# Patient Record
Sex: FEMALE | Race: WHITE | NOT HISPANIC OR LATINO | Employment: UNEMPLOYED
[De-identification: names, ages, dates, MRNs, and addresses within clinical notes are randomized per-mention and may not be internally consistent; named-entity substitution may affect disease eponyms.]

---

## 2017-07-08 ENCOUNTER — HEALTH MAINTENANCE LETTER (OUTPATIENT)
Age: 47
End: 2017-07-08

## 2018-02-10 ENCOUNTER — OFFICE VISIT (OUTPATIENT)
Dept: URGENT CARE | Facility: URGENT CARE | Age: 48
End: 2018-02-10
Payer: COMMERCIAL

## 2018-02-10 VITALS
TEMPERATURE: 98.3 F | DIASTOLIC BLOOD PRESSURE: 88 MMHG | WEIGHT: 225.2 LBS | SYSTOLIC BLOOD PRESSURE: 136 MMHG | BODY MASS INDEX: 36.35 KG/M2 | OXYGEN SATURATION: 98 % | HEART RATE: 74 BPM | RESPIRATION RATE: 20 BRPM

## 2018-02-10 DIAGNOSIS — J01.90 ACUTE SINUSITIS WITH COEXISTING CONDITION, NEED PROPHYLACTIC TREATMENT: Primary | ICD-10-CM

## 2018-02-10 PROCEDURE — 99213 OFFICE O/P EST LOW 20 MIN: CPT | Performed by: FAMILY MEDICINE

## 2018-02-10 RX ORDER — AZITHROMYCIN 250 MG/1
TABLET, FILM COATED ORAL
Qty: 6 TABLET | Refills: 0 | Status: SHIPPED | OUTPATIENT
Start: 2018-02-10 | End: 2018-02-15

## 2018-02-10 NOTE — PROGRESS NOTES
SUBJECTIVE: Brittany More is a 47 year old female presenting with a chief complaint of nasal congestion and cough .  Onset of symptoms was 1 week(s) ago.  Course of illness is same.    Severity moderate  Current and Associated symptoms: stuffy nose and cough - non-productive  Treatment measures tried include Tylenol/Ibuprofen.  Predisposing factors include None.    Past Medical History:   Diagnosis Date     Essential hypertension, benign      Allergies   Allergen Reactions     No Known Drug Allergies      Social History   Substance Use Topics     Smoking status: Never Smoker     Smokeless tobacco: Never Used     Alcohol use No       ROS:  SKIN: no rash  GI: no vomiting    OBJECTIVE:  /88  Pulse 74  Temp 98.3  F (36.8  C) (Oral)  Resp 20  Wt 225 lb 3.2 oz (102.2 kg)  SpO2 98%  BMI 36.35 kg/p1JIOIGAE APPEARANCE: healthy, alert and no distress  EYES: EOMI,  PERRL, conjunctiva clear  HENT: ear canals and TM's normal.  Nose and mouth without ulcers, erythema or lesions  NECK: supple, nontender, no lymphadenopathy  RESP: lungs clear to auscultation - no rales, rhonchi or wheezes  SKIN: no suspicious lesions or rashes      ICD-10-CM    1. Acute sinusitis with coexisting condition, need prophylactic treatment J01.90 azithromycin (ZITHROMAX) 250 MG tablet     Will fill if symptoms persist  Fluids/Rest, f/u if worse/not any better

## 2018-02-10 NOTE — MR AVS SNAPSHOT
"              After Visit Summary   2/10/2018    Brittany More    MRN: [id number]           Patient Information     Date Of Birth          1970        Visit Information        Provider Department      2/10/2018 3:35 PM Jas Perry,  [facility]        Today's Diagnoses     Acute sinusitis with coexisting condition, need prophylactic treatment    -  1       Follow-ups after your visit        Who to contact     If you have questions or need follow up information about today's clinic visit or your schedule please contact [facility] directly at [phone].  Normal or non-critical lab and imaging results will be communicated to you by [facility], letter or phone within 4 business days after the clinic has received the results. If you do not hear from us within 7 days, please contact the clinic through [place] [facility] or phone. If you have a critical or abnormal lab result, we will notify you by phone as soon as possible.  Submit refill requests through [employer] or call your pharmacy and they will forward the refill request to us. Please allow 3 business days for your refill to be completed.          Additional Information About Your Visit        MyChart Information     [employer] lets you send messages to your doctor, view your test results, renew your prescriptions, schedule appointments and more. To sign up, go to [email or web address] . Click on \"Log in\" on the left side of the screen, which will take you to the Welcome page. Then click on \"Sign up Now\" on the right side of the page.     You will be asked to enter the access code listed below, as well as some personal information. Please follow the directions to create your username and password.     Your access code is: [email or web address]  Expires: 2018  3:51 PM     Your access code will  in 90 days. If you need help or a new code, please call your [place] clinic or [phone].        Care " EveryWhere ID     This is your Care EveryWhere ID. This could be used by other organizations to access your [place] medical records  [id number]        Your Vitals Were     Pulse Temperature Respirations Pulse Oximetry BMI (Body Mass Index)       74 98.3  F (36.8  C) (Oral) 20 98% 36.35 kg/m2        Blood Pressure from Last 3 Encounters:   02/10/18 136/88   03/02/16 140/80   03/15/13 (!) 126/92    Weight from Last 3 Encounters:   02/10/18 225 lb 3.2 oz (102.2 kg)   03/02/16 225 lb (102.1 kg)   03/15/13 219 lb 4.8 oz (99.5 kg)              Today, you had the following     No orders found for display         Today's Medication Changes          These changes are accurate as of 2/10/18  3:51 PM.  If you have any questions, ask your nurse or doctor.               Start taking these medicines.        Dose/Directions    azithromycin 250 MG tablet   Commonly known as:  ZITHROMAX   Used for:  Acute sinusitis with coexisting condition, need prophylactic treatment   Started by:  Jas Perry, DO        Two tablets first day, then one tablet daily for four days.   Quantity:  6 tablet   Refills:  0            Where to get your medicines      Some of these will need a paper prescription and others can be bought over the counter.  Ask your nurse if you have questions.     Bring a paper prescription for each of these medications     azithromycin 250 MG tablet                Primary Care Provider Office Phone # Fax #    Florencio Woody Quan [phone]       [address] [address]        Equal Access to Services     [facility] AH: Hadii yodit aceveso Soestefania, waaxda luqadaha, qaybta kaalmada ciro, rustam blanc. So [facility] [phone].    ATENCIÓN: Si habla español, tiene a wiggins disposición servicios gratuitos de asistencia lingüística. Llame al [phone].    We comply with applicable federal civil rights laws and [place] laws. We do not  discriminate on the basis of race, color, national origin, age, disability, sex, sexual orientation, or gender identity.            Thank you!     Thank you for choosing [facility]  for your care. Our goal is always to provide you with excellent care. Hearing back from our patients is one way we can continue to improve our services. Please take a few minutes to complete the written survey that you may receive in the mail after your visit with us. Thank you!             Your Updated Medication List - Protect others around you: Learn how to safely use, store and throw away your medicines at www.disposemymeds.org.          This list is accurate as of 2/10/18  3:51 PM.  Always use your most recent med list.                   Brand Name Dispense Instructions for use Diagnosis    ADVIL 200 MG capsule   Generic drug:  ibuprofen      Take 200 mg by mouth every 4 hours as needed.        albuterol 108 (90 BASE) MCG/ACT Inhaler    PROAIR HFA    1 Inhaler    Inhale 2 puffs into the lungs 4 times daily as needed for shortness of breath / dyspnea or wheezing    Chest tightness       azithromycin 250 MG tablet    ZITHROMAX    6 tablet    Two tablets first day, then one tablet daily for four days.    Acute sinusitis with coexisting condition, need prophylactic treatment       lisinopril 10 MG tablet    PRINIVIL/ZESTRIL     Take 10 mg by mouth daily.        NO ACTIVE MEDICATIONS      .

## 2018-05-23 ENCOUNTER — OFFICE VISIT (OUTPATIENT)
Dept: URGENT CARE | Facility: URGENT CARE | Age: 48
End: 2018-05-23
Payer: COMMERCIAL

## 2018-05-23 VITALS
WEIGHT: 224.31 LBS | OXYGEN SATURATION: 97 % | DIASTOLIC BLOOD PRESSURE: 92 MMHG | BODY MASS INDEX: 36.2 KG/M2 | RESPIRATION RATE: 16 BRPM | SYSTOLIC BLOOD PRESSURE: 131 MMHG | TEMPERATURE: 98.3 F | HEART RATE: 71 BPM

## 2018-05-23 DIAGNOSIS — J01.00 ACUTE NON-RECURRENT MAXILLARY SINUSITIS: Primary | ICD-10-CM

## 2018-05-23 PROCEDURE — 99213 OFFICE O/P EST LOW 20 MIN: CPT | Performed by: NURSE PRACTITIONER

## 2018-05-23 RX ORDER — FLUTICASONE PROPIONATE 50 MCG
2 SPRAY, SUSPENSION (ML) NASAL DAILY
Qty: 1 BOTTLE | Refills: 0 | Status: SHIPPED | OUTPATIENT
Start: 2018-05-23 | End: 2019-03-11

## 2018-05-23 NOTE — MR AVS SNAPSHOT
After Visit Summary   5/23/2018    Brittany More    MRN: [id number]           Patient Information     Date Of Birth          1970        Visit Information        Provider Department      5/23/2018 4:20 PM Olga Lainez APRN CNP [facility]        Today's Diagnoses     Acute non-recurrent maxillary sinusitis    -  1      Care Instructions      Acute Sinusitis    Acute sinusitis is irritation and swelling of the sinuses. It is usually caused by a viral infection after a common cold. Your doctor can help you find relief.  What is acute sinusitis?  Sinuses are air-filled spaces in the skull behind the face. They are kept moist and clean by a lining of mucosa. Things such as pollen, smoke, and chemical fumes can irritate the mucosa. It can then swell up. As a response to irritation, the mucosa makes more mucus and other fluids. Tiny hairlike cilia cover the mucosa. Cilia help carry mucus toward the opening of the sinus. Too much mucus may cause the cilia to stop working. This blocks the sinus opening. A buildup of fluid in the sinuses then causes pain and pressure. It can also encourage bacteria to grow in the sinuses.  Common symptoms of acute sinusitis  You may have:    Facial soreness pain    Headache    Fever    Fluid draining in the back of the throat (postnasal drip)    Congestion    Drainage that is thick and colored, instead of clear    Cough  Diagnosing acute sinusitis  Your doctor will ask about your symptoms and health history. He or she will look at your ear, nose, and throat. You usually won't need to have X-rays taken.    The doctor may take a sample of mucus to check for bacteria. If you have sinusitis that keeps coming back, you may need imaging tests such as X-rays or CAT scans. This will help your doctor check for a structural problem that may be causing the infection.  Treating acute sinusitis  Treatment is aimed at unblocking the sinus  "opening and helping the cilia work again. You may need to take antihistamine and decongestant medicine. These can reduce inflammation and decrease the amount of fluid your sinuses make. If you have a bacterial infection, you will need to take antibiotic medicine for 10 to 14 days. Take this medicine until it is gone, even if you feel better.  Date Last Reviewed: 10/1/2016    5400-4165 [employer]. [address] [place] [address], [place] [address]. All rights reserved. This information is not intended as a substitute for professional medical care. Always follow your healthcare professional's instructions.                Follow-ups after your visit        Who to contact     If you have questions or need follow up information about today's clinic visit or your schedule please contact [facility] directly at [phone].  Normal or non-critical lab and imaging results will be communicated to you by [employer], letter or phone within 4 business days after the clinic has received the results. If you do not hear from us within 7 days, please contact the clinic through [employer] or phone. If you have a critical or abnormal lab result, we will notify you by phone as soon as possible.  Submit refill requests through [facility] or call your pharmacy and they will forward the refill request to us. Please allow 3 business days for your refill to be completed.          Additional Information About Your Visit        [employer] Information     [facility] lets you send messages to your doctor, view your test results, renew your prescriptions, schedule appointments and more. To sign up, go to [email or web address] . Click on \"Log in\" on the left side of the screen, which will take you to the Welcome page. Then click on \"Sign up Now\" on the right side of the page.     You will be asked to enter the access code listed below, as well as some personal information. Please follow the directions to create your " username and password.     Your access code is: [email or web address]  Expires: 2018  5:13 PM     Your access code will  in 90 days. If you need help or a new code, please call your [place] clinic or [phone].        Care EveryWhere ID     This is your Care EveryWhere ID. This could be used by other organizations to access your [place] medical records  [id number]        Your Vitals Were     Pulse Temperature Respirations Pulse Oximetry BMI (Body Mass Index)       71 98.3  F (36.8  C) (Oral) 16 97% 36.2 kg/m2        Blood Pressure from Last 3 Encounters:   18 (!) 131/92   02/10/18 136/88   16 140/80    Weight from Last 3 Encounters:   18 224 lb 5 oz (101.7 kg)   02/10/18 225 lb 3.2 oz (102.2 kg)   16 225 lb (102.1 kg)              Today, you had the following     No orders found for display         Today's Medication Changes          These changes are accurate as of 18  5:13 PM.  If you have any questions, ask your nurse or doctor.               Start taking these medicines.        Dose/Directions    amoxicillin-clavulanate 875-125 MG per tablet   Commonly known as:  AUGMENTIN   Used for:  Acute non-recurrent maxillary sinusitis   Started by:  Olga Lainez APRN CNP        Dose:  1 tablet   Take 1 tablet by mouth 2 times daily   Quantity:  20 tablet   Refills:  0       fluticasone 50 MCG/ACT spray   Commonly known as:  FLONASE   Used for:  Acute non-recurrent maxillary sinusitis   Started by:  Olga Lainez APRN CNP        Dose:  2 spray   Spray 2 sprays into both nostrils daily   Quantity:  1 Bottle   Refills:  0         Stop taking these medicines if you haven't already. Please contact your care team if you have questions.     albuterol 108 (90 Base) MCG/ACT Inhaler   Commonly known as:  PROAIR HFA   Stopped by:  Olga Lainez APRN CNP                Where to get your medicines      These medications were sent to [facility]  [facility] [address]  [address], [facility] [address]     Phone:  [phone]     amoxicillin-clavulanate 875-125 MG per tablet    fluticasone 50 MCG/ACT spray                Primary Care Provider Office Phone # Fax #    Florencio Woody Quan [phone]       [place] [address] [address] [address]        Equal Access to Services     [facility] AH: Hadii aad ku hadasho Soomaali, waaxda luqadaha, qaybta kaalmada adeegyada, waxay idiin hayaan adeeg kharash la'aan ah. So [facility] [phone].    ATENCIÓN: Si habla español, tiene a wiggins disposición servicios gratuitos de asistencia lingüística. [facility] [phone].    We comply with applicable federal civil rights laws and [place] laws. We do not discriminate on the basis of race, color, national origin, age, disability, sex, sexual orientation, or gender identity.            Thank you!     Thank you for choosing [facility]  for your care. Our goal is always to provide you with excellent care. Hearing back from our patients is one way we can continue to improve our services. Please take a few minutes to complete the written survey that you may receive in the mail after your visit with us. Thank you!             Your Updated Medication List - Protect others around you: Learn how to safely use, store and throw away your medicines at www.disposemymeds.org.          This list is accurate as of 5/23/18  5:13 PM.  Always use your most recent med list.                   Brand Name Dispense Instructions for use Diagnosis    ADVIL 200 MG capsule   Generic drug:  ibuprofen      Take 200 mg by mouth every 4 hours as needed.        amoxicillin-clavulanate 875-125 MG per tablet    AUGMENTIN    20 tablet    Take 1 tablet by mouth 2 times daily    Acute non-recurrent maxillary sinusitis       fluticasone 50 MCG/ACT spray    FLONASE    1 Bottle    Spray 2 sprays into both nostrils daily    Acute  non-recurrent maxillary sinusitis       lisinopril 10 MG tablet    PRINIVIL/ZESTRIL     Take 10 mg by mouth daily.        NO ACTIVE MEDICATIONS      .

## 2018-05-23 NOTE — PATIENT INSTRUCTIONS
Acute Sinusitis    Acute sinusitis is irritation and swelling of the sinuses. It is usually caused by a viral infection after a common cold. Your doctor can help you find relief.  What is acute sinusitis?  Sinuses are air-filled spaces in the skull behind the face. They are kept moist and clean by a lining of mucosa. Things such as pollen, smoke, and chemical fumes can irritate the mucosa. It can then swell up. As a response to irritation, the mucosa makes more mucus and other fluids. Tiny hairlike cilia cover the mucosa. Cilia help carry mucus toward the opening of the sinus. Too much mucus may cause the cilia to stop working. This blocks the sinus opening. A buildup of fluid in the sinuses then causes pain and pressure. It can also encourage bacteria to grow in the sinuses.  Common symptoms of acute sinusitis  You may have:    Facial soreness pain    Headache    Fever    Fluid draining in the back of the throat (postnasal drip)    Congestion    Drainage that is thick and colored, instead of clear    Cough  Diagnosing acute sinusitis  Your doctor will ask about your symptoms and health history. He or she will look at your ear, nose, and throat. You usually won't need to have X-rays taken.    The doctor may take a sample of mucus to check for bacteria. If you have sinusitis that keeps coming back, you may need imaging tests such as X-rays or CAT scans. This will help your doctor check for a structural problem that may be causing the infection.  Treating acute sinusitis  Treatment is aimed at unblocking the sinus opening and helping the cilia work again. You may need to take antihistamine and decongestant medicine. These can reduce inflammation and decrease the amount of fluid your sinuses make. If you have a bacterial infection, you will need to take antibiotic medicine for 10 to 14 days. Take this medicine until it is gone, even if you feel better.  Date Last Reviewed: 10/1/2016    3344-1177 [employer],  [facility]. [address] [place] [address]. All rights reserved. This information is not intended as a substitute for professional medical care. Always follow your healthcare professional's instructions.

## 2018-05-23 NOTE — PROGRESS NOTES
SUBJECTIVE:   Brittany More is a 47 year old female presenting with a chief complaint of   Chief Complaint   Patient presents with     Cough     coigh, chest congestion, wheezing, sob and fatigue for a few days.        She is an established patient of [place].    URI Adult    Onset of symptoms was 7 day(s) ago.  Course of illness is worsening.    Severity moderate  Current and Associated symptoms: stuffy nose, facial pain/pressure and fatigue  Treatment measures tried include Tylenol/Ibuprofen.  Predisposing factors include .      Review of Systems   All other systems reviewed and are negative.      Past Medical History:   Diagnosis Date     Essential hypertension, benign      Family History   Problem Relation Age of Onset     Breast Cancer Mother      dx age 65     DIABETES Father      Thyroid Disease Father      Hypertension Father      Blood Disease Father      clotting     C.A.D. Father      Arthritis Brother      Lipids Brother      Current Outpatient Prescriptions   Medication Sig Dispense Refill     Ibuprofen (ADVIL) 200 MG capsule Take 200 mg by mouth every 4 hours as needed.       lisinopril (PRINIVIL,ZESTRIL) 10 MG tablet Take 10 mg by mouth daily.       NO ACTIVE MEDICATIONS .       Social History   Substance Use Topics     Smoking status: Never Smoker     Smokeless tobacco: Never Used     Alcohol use No       OBJECTIVE  BP (!) 131/92  Pulse 71  Temp 98.3  F (36.8  C) (Oral)  Resp 16  Wt 224 lb 5 oz (101.7 kg)  SpO2 97%  BMI 36.2 kg/m2    Physical Exam   Constitutional: She appears well-developed and well-nourished.   HENT:   Head: Normocephalic and atraumatic.   Right Ear: External ear normal.   Left Ear: External ear normal.   Nose: Mucosal edema and rhinorrhea present. Right sinus exhibits maxillary sinus tenderness and frontal sinus tenderness. Left sinus exhibits maxillary sinus tenderness and frontal sinus tenderness.   Mouth/Throat: Oropharynx is clear and moist.   Eyes:  EOM are normal. Pupils are equal, round, and reactive to light.   Neck: Normal range of motion. Neck supple.   Cardiovascular: Normal rate and regular rhythm.    Pulmonary/Chest: Effort normal and breath sounds normal.   Neurological: She is alert.   Psychiatric: She has a normal mood and affect.       Labs:  No results found for this or any previous visit (from the past 24 hour(s)).        ASSESSMENT:      ICD-10-CM    1. Acute non-recurrent maxillary sinusitis J01.00 amoxicillin-clavulanate (AUGMENTIN) 875-125 MG per tablet     fluticasone (FLONASE) 50 MCG/ACT spray        PLAN:    URI Adult:  Tylenol, Ibuprofen, Fluids, Rest, OTC decongestant/antihistamine and Saline nasal spray  augmentin if greater than 10 days or worsening     Followup:    If not improving or if condition worsens, follow up with your Primary Care Provider    Patient Instructions     Acute Sinusitis    Acute sinusitis is irritation and swelling of the sinuses. It is usually caused by a viral infection after a common cold. Your doctor can help you find relief.  What is acute sinusitis?  Sinuses are air-filled spaces in the skull behind the face. They are kept moist and clean by a lining of mucosa. Things such as pollen, smoke, and chemical fumes can irritate the mucosa. It can then swell up. As a response to irritation, the mucosa makes more mucus and other fluids. Tiny hairlike cilia cover the mucosa. Cilia help carry mucus toward the opening of the sinus. Too much mucus may cause the cilia to stop working. This blocks the sinus opening. A buildup of fluid in the sinuses then causes pain and pressure. It can also encourage bacteria to grow in the sinuses.  Common symptoms of acute sinusitis  You may have:    Facial soreness pain    Headache    Fever    Fluid draining in the back of the throat (postnasal drip)    Congestion    Drainage that is thick and colored, instead of clear    Cough  Diagnosing acute sinusitis  Your doctor will ask about  your symptoms and health history. He or she will look at your ear, nose, and throat. You usually won't need to have X-rays taken.    The doctor may take a sample of mucus to check for bacteria. If you have sinusitis that keeps coming back, you may need imaging tests such as X-rays or CAT scans. This will help your doctor check for a structural problem that may be causing the infection.  Treating acute sinusitis  Treatment is aimed at unblocking the sinus opening and helping the cilia work again. You may need to take antihistamine and decongestant medicine. These can reduce inflammation and decrease the amount of fluid your sinuses make. If you have a bacterial infection, you will need to take antibiotic medicine for 10 to 14 days. Take this medicine until it is gone, even if you feel better.  Date Last Reviewed: 10/1/2016    4579-0631 [employer]. [address] [place] [address], [place] [address]. All rights reserved. This information is not intended as a substitute for professional medical care. Always follow your healthcare professional's instructions.

## 2019-03-11 ENCOUNTER — OFFICE VISIT (OUTPATIENT)
Dept: URGENT CARE | Facility: URGENT CARE | Age: 49
End: 2019-03-11
Payer: COMMERCIAL

## 2019-03-11 VITALS
WEIGHT: 223 LBS | DIASTOLIC BLOOD PRESSURE: 87 MMHG | HEART RATE: 89 BPM | OXYGEN SATURATION: 98 % | TEMPERATURE: 98.4 F | SYSTOLIC BLOOD PRESSURE: 130 MMHG | RESPIRATION RATE: 17 BRPM

## 2019-03-11 DIAGNOSIS — R05.9 COUGH: ICD-10-CM

## 2019-03-11 DIAGNOSIS — R09.89 CHEST CONGESTION: Primary | ICD-10-CM

## 2019-03-11 DIAGNOSIS — R05.8 PRODUCTIVE COUGH: ICD-10-CM

## 2019-03-11 PROCEDURE — 99214 OFFICE O/P EST MOD 30 MIN: CPT | Performed by: PHYSICIAN ASSISTANT

## 2019-03-11 RX ORDER — AZITHROMYCIN 250 MG/1
TABLET, FILM COATED ORAL
Qty: 6 TABLET | Refills: 0 | Status: SHIPPED | OUTPATIENT
Start: 2019-03-11 | End: 2019-03-29

## 2019-03-11 RX ORDER — BENZONATATE 200 MG/1
200 CAPSULE ORAL 3 TIMES DAILY PRN
Qty: 21 CAPSULE | Refills: 0 | Status: SHIPPED | OUTPATIENT
Start: 2019-03-11 | End: 2019-03-29

## 2019-03-11 NOTE — PROGRESS NOTES
SUBJECTIVE:   Brittany More is a 48 year old female presenting with a chief complaint of stuffy nose, cough - productive and facial pain/pressure.  Onset of symptoms was 4 day(s) ago.  Course of illness is worsening.    Severity moderate  Current and Associated symptoms: chest congestion, productive cough  Treatment measures tried include OTC medicaitons.  Predisposing factors include recent illness.    Past Medical History:   Diagnosis Date     Essential hypertension, benign         Allergies   Allergen Reactions     No Known Drug Allergies      Family History   Problem Relation Age of Onset     Breast Cancer Mother         dx age 65     Diabetes Father      Thyroid Disease Father      Hypertension Father      Blood Disease Father         clotting     C.A.D. Father      Arthritis Brother      Lipids Brother          Social History     Tobacco Use     Smoking status: Never Smoker     Smokeless tobacco: Never Used   Substance Use Topics     Alcohol use: No       ROS:  CONSTITUTIONAL:NEGATIVE for fever, chills, change in weight  INTEGUMENTARY/SKIN: NEGATIVE for worrisome rashes, moles or lesions  EYES: NEGATIVE for vision changes or irritation  ENT/MOUTH: POSITIVE for nasal congestion  RESP:POSITIVE for cough-productive  CV: NEGATIVE for chest pain, palpitations or peripheral edema  GI: NEGATIVE for nausea, abdominal pain, heartburn, or change in bowel habits  MUSCULOSKELETAL: NEGATIVE for significant arthralgias or myalgia  NEURO: NEGATIVE for weakness, dizziness or paresthesias    OBJECTIVE  :/87   Pulse 89   Temp 98.4  F (36.9  C) (Oral)   Resp 17   Wt 101.2 kg (223 lb)   SpO2 98%   GENERAL APPEARANCE: healthy, alert and no distress  EYES: EOMI,  PERRL, conjunctiva clear  HENT: TM's normal bilaterally and nasal turbinates erythematous, swollen  NECK: supple, nontender, no lymphadenopathy  RESP: lungs clear to auscultation - no rales, rhonchi or wheezes  CV: regular rates and rhythm, normal S1 S2, no  murmur noted  ABDOMEN:  soft, nontender, no HSM or masses and bowel sounds normal  NEURO: Normal strength and tone, sensory exam grossly normal,  normal speech and mentation  SKIN: no suspicious lesions or rashes    ASSESSMENT/PLAN    ICD-10-CM    1. Chest congestion R09.89 azithromycin (ZITHROMAX) 250 MG tablet   2. Productive cough R05 azithromycin (ZITHROMAX) 250 MG tablet   3. Cough R05 benzonatate (TESSALON) 200 MG capsule       Orders Placed This Encounter     azithromycin (ZITHROMAX) 250 MG tablet     benzonatate (TESSALON) 200 MG capsule       Fluids, rest  Follow up with PCP as needed  See orders in Epic

## 2019-03-11 NOTE — LETTER
[facility] [place] [facility]  [address] [address]  [phone]      March 11, 2019    RE:  Brittany More                                                                                                                                                       [facility] [address]            To whom it may concern:    Brittany More was seen in the urgent care today for bronchitis.  She was treated with antibiotics and cough medicine.        Sincerely,        [facility]

## 2019-03-29 ENCOUNTER — OFFICE VISIT (OUTPATIENT)
Dept: URGENT CARE | Facility: URGENT CARE | Age: 49
End: 2019-03-29
Payer: COMMERCIAL

## 2019-03-29 VITALS
WEIGHT: 228.38 LBS | HEART RATE: 84 BPM | DIASTOLIC BLOOD PRESSURE: 82 MMHG | TEMPERATURE: 98.2 F | SYSTOLIC BLOOD PRESSURE: 124 MMHG

## 2019-03-29 DIAGNOSIS — H01.003 BLEPHARITIS OF BOTH EYES, UNSPECIFIED EYELID, UNSPECIFIED TYPE: Primary | ICD-10-CM

## 2019-03-29 DIAGNOSIS — H01.006 BLEPHARITIS OF BOTH EYES, UNSPECIFIED EYELID, UNSPECIFIED TYPE: Primary | ICD-10-CM

## 2019-03-29 PROCEDURE — 99213 OFFICE O/P EST LOW 20 MIN: CPT | Performed by: FAMILY MEDICINE

## 2019-03-29 RX ORDER — CIPROFLOXACIN HYDROCHLORIDE 3.5 MG/ML
SOLUTION/ DROPS TOPICAL
Qty: 1 BOTTLE | Refills: 0 | Status: SHIPPED | OUTPATIENT
Start: 2019-03-29

## 2019-03-29 RX ORDER — AZITHROMYCIN 250 MG/1
TABLET, FILM COATED ORAL
Qty: 6 TABLET | Refills: 0 | Status: SHIPPED | OUTPATIENT
Start: 2019-03-29 | End: 2019-04-03

## 2019-03-29 NOTE — PROGRESS NOTES
SUBJECTIVE:  Brittany More presents  for evaluation of infection in both eye. The eye has been itchy with drainage.  Brittany woke this AM with the eye glued shut with matter.  There has been no foreign body in eye, contact lens, upper respiratory infection or allergies.  No one at home has had infection.    OBJECTIVE:  /82   Pulse 84   Temp 98.2  F (36.8  C) (Oral)   Wt 103.6 kg (228 lb 6 oz)   EYES: left eye blepharal and scleral injection - no crusting  Positive discharge. discharge.  No periorbital edema   Lids red , most on rt    ASSESSMENT       CONJUNCTIVITIS, ACUTE  bleharitis    PLAN:        ciloxan  2 zithro    Patient education re: conjunctivitis, viral vs bacterial, treatment, precautions and prevention of spread to others.  return to clinic within week if no improvement of worsening

## 2019-07-08 ENCOUNTER — OFFICE VISIT (OUTPATIENT)
Dept: URGENT CARE | Facility: URGENT CARE | Age: 49
End: 2019-07-08
Payer: COMMERCIAL

## 2019-07-08 VITALS
RESPIRATION RATE: 16 BRPM | OXYGEN SATURATION: 98 % | HEART RATE: 80 BPM | HEIGHT: 65 IN | SYSTOLIC BLOOD PRESSURE: 129 MMHG | BODY MASS INDEX: 37.99 KG/M2 | WEIGHT: 228 LBS | TEMPERATURE: 98.8 F | DIASTOLIC BLOOD PRESSURE: 80 MMHG

## 2019-07-08 DIAGNOSIS — H10.023 PINK EYE DISEASE OF BOTH EYES: Primary | ICD-10-CM

## 2019-07-08 PROCEDURE — 99213 OFFICE O/P EST LOW 20 MIN: CPT | Performed by: FAMILY MEDICINE

## 2019-07-08 RX ORDER — TOBRAMYCIN 3 MG/ML
2 SOLUTION/ DROPS OPHTHALMIC 4 TIMES DAILY
Qty: 3 ML | Refills: 0 | Status: SHIPPED | OUTPATIENT
Start: 2019-07-08 | End: 2019-07-15

## 2019-07-08 ASSESSMENT — MIFFLIN-ST. JEOR: SCORE: 1660.08

## 2019-07-08 NOTE — PROGRESS NOTES
"SUBJECTIVE:Chief Complaint:   Chief Complaint   Patient presents with     Eye Problem     Pt woke up with both eyes crusted over and itchy       History of Present Illness: Brittany More is a 48 year old female who presents complaining of both eyes mattering and redness for 1 days.  Onset/timing: gradual.   Associated Signs and Symptoms: none   Treatment measures tried include: none   Contact wearer : No    Past Medical History:   Diagnosis Date     Essential hypertension, benign      Allergies   Allergen Reactions     No Known Drug Allergies      Social History     Tobacco Use     Smoking status: Never Smoker     Smokeless tobacco: Never Used   Substance Use Topics     Alcohol use: No     ROS:  negative for photophobia, pain, vision change    OBJECTIVE:  /80 (BP Location: Left arm, Patient Position: Sitting, Cuff Size: Adult Regular)   Pulse 80   Temp 98.8  F (37.1  C) (Oral)   Resp 16   Ht 1.651 m (5' 5\")   Wt 103.4 kg (228 lb)   SpO2 98%   BMI 37.94 kg/m     General: no acute distress  Eye exam: right eye abnormal findings: conjunctivitis with erythema, discharge and matting noted, left eye abnormal findings: conjunctivitis with erythema, discharge and matting noted.  CAMILLE, EOMI, fundi normal, corneas normal, no foreign bodies, visual acuity normal both eyes, no periorbital cellulitis      ICD-10-CM    1. Pink eye disease of both eyes H10.023 tobramycin (TOBREX) 0.3 % ophthalmic solution         "

## 2020-05-23 ENCOUNTER — OFFICE VISIT (OUTPATIENT)
Dept: URGENT CARE | Facility: URGENT CARE | Age: 50
End: 2020-05-23
Payer: COMMERCIAL

## 2020-05-23 VITALS
TEMPERATURE: 99.2 F | DIASTOLIC BLOOD PRESSURE: 89 MMHG | SYSTOLIC BLOOD PRESSURE: 134 MMHG | HEART RATE: 96 BPM | OXYGEN SATURATION: 97 %

## 2020-05-23 DIAGNOSIS — R59.9 ENLARGED LYMPH NODES: Primary | ICD-10-CM

## 2020-05-23 LAB
BASOPHILS # BLD AUTO: 0.1 10E9/L (ref 0–0.2)
BASOPHILS NFR BLD AUTO: 0.5 %
DIFFERENTIAL METHOD BLD: NORMAL
EOSINOPHIL # BLD AUTO: 0.2 10E9/L (ref 0–0.7)
EOSINOPHIL NFR BLD AUTO: 2.4 %
ERYTHROCYTE [DISTWIDTH] IN BLOOD BY AUTOMATED COUNT: 14.4 % (ref 10–15)
HCT VFR BLD AUTO: 39.8 % (ref 35–47)
HGB BLD-MCNC: 13 G/DL (ref 11.7–15.7)
LYMPHOCYTES # BLD AUTO: 2 10E9/L (ref 0.8–5.3)
LYMPHOCYTES NFR BLD AUTO: 20.1 %
MCH RBC QN AUTO: 29.4 PG (ref 26.5–33)
MCHC RBC AUTO-ENTMCNC: 32.7 G/DL (ref 31.5–36.5)
MCV RBC AUTO: 90 FL (ref 78–100)
MONOCYTES # BLD AUTO: 0.8 10E9/L (ref 0–1.3)
MONOCYTES NFR BLD AUTO: 7.6 %
NEUTROPHILS # BLD AUTO: 7 10E9/L (ref 1.6–8.3)
NEUTROPHILS NFR BLD AUTO: 69.4 %
PLATELET # BLD AUTO: 358 10E9/L (ref 150–450)
RBC # BLD AUTO: 4.42 10E12/L (ref 3.8–5.2)
WBC # BLD AUTO: 10 10E9/L (ref 4–11)

## 2020-05-23 PROCEDURE — 99214 OFFICE O/P EST MOD 30 MIN: CPT | Performed by: NURSE PRACTITIONER

## 2020-05-23 PROCEDURE — 36415 COLL VENOUS BLD VENIPUNCTURE: CPT | Performed by: NURSE PRACTITIONER

## 2020-05-23 PROCEDURE — 85025 COMPLETE CBC W/AUTO DIFF WBC: CPT | Performed by: NURSE PRACTITIONER

## 2020-05-23 NOTE — PROGRESS NOTES
SUBJECTIVE:   Brittany More is a 49 year old female presenting with a chief complaint of tenderness behind the left ear for several days. She did see the dentist on Tuesday for a cleaning. She does have a history of TMJ disorder, but this feels very different. The are is tender and swollen.    Onset of symptoms was 3 day(s) ago.  Course of illness is same.    Severity moderate  Previous Treatment none      Past Medical History:   Diagnosis Date     Essential hypertension, benign      Current Outpatient Medications   Medication Sig Dispense Refill     lisinopril (PRINIVIL,ZESTRIL) 10 MG tablet Take 40 mg by mouth daily        ciprofloxacin (CILOXAN) 0.3 % ophthalmic solution 1 gtt each eye tid for 7 days (Patient not taking: Reported on 7/8/2019) 1 Bottle 0     Ibuprofen (ADVIL) 200 MG capsule Take 200 mg by mouth every 4 hours as needed.       Social History     Tobacco Use     Smoking status: Never Smoker     Smokeless tobacco: Never Used   Substance Use Topics     Alcohol use: No     Family History   Problem Relation Age of Onset     Breast Cancer Mother         dx age 65     Diabetes Father      Thyroid Disease Father      Hypertension Father      Blood Disease Father         clotting     C.A.D. Father      Arthritis Brother      Lipids Brother         ROS: 10 point ROS neg other than the symptoms noted above in the HPI.     OBJECTIVE  /89   Pulse 96   Temp 99.2  F (37.3  C) (Oral)   SpO2 97%       GENERAL APPEARANCE: healthy appearing, alert     EYES: PERRL, EOMI, sclera non-icteric     HENT: oral exam benign, mucus membranes intact, without ulcers or lesions     NECK: postauricular adenopathy on the left     RESP: lungs clear to auscultation - no rales, rhonchi or wheezes     CV: regular rates and rhythm, no murmurs, rubs, or gallop     MS: extremities normal- no gross deformities noted; normal muscle tone.     SKIN: no suspicious lesions or rashes     NEURO: Normal strength and tone, mentation  intact and speech normal     PSYCH: normal thought process; no significant mood disturbance      Labs:  Results for orders placed or performed in visit on 05/23/20 (from the past 24 hour(s))   CBC with platelets and differential   Result Value Ref Range    WBC 10.0 4.0 - 11.0 10e9/L    RBC Count 4.42 3.8 - 5.2 10e12/L    Hemoglobin 13.0 11.7 - 15.7 g/dL    Hematocrit 39.8 35.0 - 47.0 %    MCV 90 78 - 100 fl    MCH 29.4 26.5 - 33.0 pg    MCHC 32.7 31.5 - 36.5 g/dL    RDW 14.4 10.0 - 15.0 %    Platelet Count 358 150 - 450 10e9/L    % Neutrophils 69.4 %    % Lymphocytes 20.1 %    % Monocytes 7.6 %    % Eosinophils 2.4 %    % Basophils 0.5 %    Absolute Neutrophil 7.0 1.6 - 8.3 10e9/L    Absolute Lymphocytes 2.0 0.8 - 5.3 10e9/L    Absolute Monocytes 0.8 0.0 - 1.3 10e9/L    Absolute Eosinophils 0.2 0.0 - 0.7 10e9/L    Absolute Basophils 0.1 0.0 - 0.2 10e9/L    Diff Method Automated Method          ASSESSMENT/PLAN:      ICD-10-CM    1. Enlarged lymph nodes  R59.9 CBC with platelets and differential      Will need further evaluation if enlargement persist to rule out other causes, such as lymphoma.  Follow Up:      Patient Instructions   Acetaminophen (Tylenol) may be taken up to 4000mg daily (3000mg if over 65) which would be 2 regular strength tablets (325mg) or two extra strength tablets(500mg) up to 4 times a day (3 times a day if over 65).   Check for acetaminophen in other OTC or prescription medications and be sure you add this in the maximum amount you take every day.    Too much acetaminophen can lead to serious liver damage. DO NOT TAKE if you have a history of liver disease.    Ibuprofen 200mg tablets may be taken up to 4 pills 4 times a day(three times a day if over 65)  to the maximum of 3200mg,  (2400mg if >65)  daily as needed for pain.   Take with food.  Don't take with aspirin, Aleve or other antiinflammatory medication or with warfarin. DO NOT TAKE if you have a history of kidney disease.      Warm  packs.    If worsens come in for a recheck, if not gone in a month see your primary care provider.      CHERIE Villarreal, CNP  [facility] Provider

## 2022-05-09 ENCOUNTER — HOSPITAL ENCOUNTER (EMERGENCY)
Facility: CLINIC | Age: 52
Discharge: HOME | End: 2022-05-09
Attending: EMERGENCY MEDICINE | Admitting: EMERGENCY MEDICINE
Payer: COMMERCIAL

## 2022-05-09 VITALS
OXYGEN SATURATION: 97 % | BODY MASS INDEX: 33.75 KG/M2 | SYSTOLIC BLOOD PRESSURE: 119 MMHG | RESPIRATION RATE: 16 BRPM | DIASTOLIC BLOOD PRESSURE: 78 MMHG | WEIGHT: 210 LBS | HEIGHT: 66 IN | HEART RATE: 79 BPM | TEMPERATURE: 98.1 F

## 2022-05-09 DIAGNOSIS — U07.1 INFECTION DUE TO 2019 NOVEL CORONAVIRUS: ICD-10-CM

## 2022-05-09 LAB
ALBUMIN SERPL-MCNC: 4 G/DL (ref 3.4–5)
ALP SERPL-CCNC: 55 U/L (ref 40–150)
ALT SERPL W P-5'-P-CCNC: 37 U/L (ref 0–50)
ANION GAP SERPL CALCULATED.3IONS-SCNC: 5 MMOL/L (ref 3–14)
AST SERPL W P-5'-P-CCNC: 19 U/L (ref 0–45)
BASOPHILS # BLD AUTO: 0.1 10E3/UL (ref 0–0.2)
BASOPHILS NFR BLD AUTO: 1 %
BILIRUB SERPL-MCNC: 0.2 MG/DL (ref 0.2–1.3)
BUN SERPL-MCNC: 12 MG/DL (ref 7–30)
CALCIUM SERPL-MCNC: 9.2 MG/DL (ref 8.5–10.1)
CHLORIDE BLD-SCNC: 108 MMOL/L (ref 94–109)
CO2 SERPL-SCNC: 25 MMOL/L (ref 20–32)
CREAT SERPL-MCNC: 0.56 MG/DL (ref 0.52–1.04)
EOSINOPHIL # BLD AUTO: 0 10E3/UL (ref 0–0.7)
EOSINOPHIL NFR BLD AUTO: 1 %
ERYTHROCYTE [DISTWIDTH] IN BLOOD BY AUTOMATED COUNT: 13.2 % (ref 10–15)
ERYTHROCYTE [SEDIMENTATION RATE] IN BLOOD BY WESTERGREN METHOD: 10 MM/HR (ref 0–30)
GFR SERPL CREATININE-BSD FRML MDRD: >90 ML/MIN/1.73M2
GLUCOSE BLD-MCNC: 87 MG/DL (ref 70–99)
HCT VFR BLD AUTO: 43.8 % (ref 35–47)
HGB BLD-MCNC: 14.2 G/DL (ref 11.7–15.7)
IMM GRANULOCYTES # BLD: 0 10E3/UL
IMM GRANULOCYTES NFR BLD: 0 %
LYMPHOCYTES # BLD AUTO: 0.4 10E3/UL (ref 0.8–5.3)
LYMPHOCYTES NFR BLD AUTO: 8 %
MCH RBC QN AUTO: 30.5 PG (ref 26.5–33)
MCHC RBC AUTO-ENTMCNC: 32.4 G/DL (ref 31.5–36.5)
MCV RBC AUTO: 94 FL (ref 78–100)
MONOCYTES # BLD AUTO: 0.8 10E3/UL (ref 0–1.3)
MONOCYTES NFR BLD AUTO: 16 %
NEUTROPHILS # BLD AUTO: 3.7 10E3/UL (ref 1.6–8.3)
NEUTROPHILS NFR BLD AUTO: 74 %
NRBC # BLD AUTO: 0 10E3/UL
NRBC BLD AUTO-RTO: 0 /100
PLATELET # BLD AUTO: 261 10E3/UL (ref 150–450)
POTASSIUM BLD-SCNC: 3.8 MMOL/L (ref 3.4–5.3)
PROT SERPL-MCNC: 7.8 G/DL (ref 6.8–8.8)
RBC # BLD AUTO: 4.66 10E6/UL (ref 3.8–5.2)
SARS-COV-2 RNA RESP QL NAA+PROBE: POSITIVE
SODIUM SERPL-SCNC: 138 MMOL/L (ref 133–144)
WBC # BLD AUTO: 5.1 10E3/UL (ref 4–11)

## 2022-05-09 PROCEDURE — U0003 INFECTIOUS AGENT DETECTION BY NUCLEIC ACID (DNA OR RNA); SEVERE ACUTE RESPIRATORY SYNDROME CORONAVIRUS 2 (SARS-COV-2) (CORONAVIRUS DISEASE [COVID-19]), AMPLIFIED PROBE TECHNIQUE, MAKING USE OF HIGH THROUGHPUT TECHNOLOGIES AS DESCRIBED BY CMS-2020-01-R: HCPCS | Performed by: EMERGENCY MEDICINE

## 2022-05-09 PROCEDURE — 250N000011 HC RX IP 250 OP 636: Performed by: EMERGENCY MEDICINE

## 2022-05-09 PROCEDURE — 80053 COMPREHEN METABOLIC PANEL: CPT | Performed by: EMERGENCY MEDICINE

## 2022-05-09 PROCEDURE — 99284 EMERGENCY DEPT VISIT MOD MDM: CPT | Mod: 25

## 2022-05-09 PROCEDURE — 36415 COLL VENOUS BLD VENIPUNCTURE: CPT | Performed by: EMERGENCY MEDICINE

## 2022-05-09 PROCEDURE — 96375 TX/PRO/DX INJ NEW DRUG ADDON: CPT

## 2022-05-09 PROCEDURE — 85652 RBC SED RATE AUTOMATED: CPT | Performed by: EMERGENCY MEDICINE

## 2022-05-09 PROCEDURE — C9803 HOPD COVID-19 SPEC COLLECT: HCPCS

## 2022-05-09 PROCEDURE — 96374 THER/PROPH/DIAG INJ IV PUSH: CPT

## 2022-05-09 PROCEDURE — 85004 AUTOMATED DIFF WBC COUNT: CPT | Performed by: EMERGENCY MEDICINE

## 2022-05-09 RX ORDER — DEXAMETHASONE SODIUM PHOSPHATE 10 MG/ML
10 INJECTION, SOLUTION INTRAMUSCULAR; INTRAVENOUS ONCE
Status: COMPLETED | OUTPATIENT
Start: 2022-05-09 | End: 2022-05-09

## 2022-05-09 RX ORDER — DIPHENHYDRAMINE HYDROCHLORIDE 50 MG/ML
50 INJECTION INTRAMUSCULAR; INTRAVENOUS ONCE
Status: COMPLETED | OUTPATIENT
Start: 2022-05-09 | End: 2022-05-09

## 2022-05-09 RX ADMIN — DIPHENHYDRAMINE HYDROCHLORIDE 50 MG: 50 INJECTION, SOLUTION INTRAMUSCULAR; INTRAVENOUS at 15:28

## 2022-05-09 RX ADMIN — DEXAMETHASONE SODIUM PHOSPHATE 10 MG: 10 INJECTION, SOLUTION INTRAMUSCULAR; INTRAVENOUS at 15:28

## 2022-05-09 RX ADMIN — PROCHLORPERAZINE EDISYLATE 10 MG: 5 INJECTION INTRAMUSCULAR; INTRAVENOUS at 15:30

## 2022-05-09 ASSESSMENT — ENCOUNTER SYMPTOMS
WEAKNESS: 0
NECK PAIN: 1
SORE THROAT: 0
BACK PAIN: 1
FEVER: 0
NUMBNESS: 0
HEADACHES: 1
COUGH: 0

## 2022-05-09 NOTE — ED PROVIDER NOTES
History   Chief Complaint:  Headache     The history is provided by the patient and medical records.      Brittany More is a 51 year old female with history of hypertension who presents with headache. The patient states she had an onset of headache yesterday morning that has persisted today. She states headache worsens throughout the day and is present over her entire head, worse on the right, and radiates down her posterior neck. She endorses low back pain and bilateral hip pain as well. She has taken ibuprofen with no improvement of symptoms. She denies visual disturbances. She denies fevers, cough, or sore throat. She denies numbness or weakness to her upper or lower extremities. She denies recent travels, history of surgeries, or hospital admissions. She denies tobacco or alcohol use. She has received her COVID vaccines.     Review of Systems   Constitutional: Negative for fever.   HENT: Negative for sore throat.    Eyes: Negative for visual disturbance.   Respiratory: Negative for cough.    Musculoskeletal: Positive for back pain and neck pain.   Neurological: Positive for headaches. Negative for weakness and numbness.   All other systems reviewed and are negative.    Allergies:  The patient has no known allergies.     Medications:  Lisinopril     Past Medical History:     Hypertension    Vertigo   Psoriasis     Family History:    Mother - Breast Cancer  Father - Diabetes Mellitus, Thyroid Disease, Hypertension, Coronary Artery Disease    Social History:  The patient was accompanied to the emergency department by her .    Physical Exam     Patient Vitals for the past 24 hrs:   BP Temp Temp src Pulse Resp SpO2 Height Weight   05/09/22 1652 -- -- -- -- -- 96 % -- --   05/09/22 1636 120/86 -- -- -- -- 99 % -- --   05/09/22 1630 120/86 -- -- 79 -- 99 % -- --   05/09/22 1615 -- -- -- -- -- 99 % -- --   05/09/22 1606 -- -- -- -- -- 100 % -- --   05/09/22 1601 129/88 -- -- 80 -- (!) 84 % -- --   05/09/22  "1306 (!) 149/90 98.1  F (36.7  C) Temporal 111 16 97 % 1.676 m (5' 6\") 95.3 kg (210 lb)     Physical Exam  Constitutional: Middle aged white female, supine. No respiratory distress.   HENT: No signs of trauma.   Eyes: EOM are normal. Pupils are equal, round, and reactive to light.   Neck: Normal range of motion. No JVD present. No cervical adenopathy. No meningismus.   Cardiovascular: Regular rhythm.  Exam reveals no gallop and no friction rub.    No murmur heard.  Pulmonary/Chest: Bilateral breath sounds normal. No wheezes, rhonchi or rales.  Abdominal: Soft. No tenderness. No rebound or guarding.   Musculoskeletal: No edema. No tenderness.   Lymphadenopathy: No lymphadenopathy.   Neurological: Alert and oriented to person, place, and time. Normal strength. Coordination normal. Fluent speech. No facial asymmetry. Normal sensation.   Skin: Skin is warm and dry. Faint erythematous rash on face and upper chest.      Emergency Department Course   Laboratory:  Labs Ordered and Resulted from Time of ED Arrival to Time of ED Departure   COVID-19 VIRUS (CORONAVIRUS) BY PCR - Abnormal       Result Value    SARS CoV2 PCR Positive (*)    CBC WITH PLATELETS AND DIFFERENTIAL - Abnormal    WBC Count 5.1      RBC Count 4.66      Hemoglobin 14.2      Hematocrit 43.8      MCV 94      MCH 30.5      MCHC 32.4      RDW 13.2      Platelet Count 261      % Neutrophils 74      % Lymphocytes 8      % Monocytes 16      % Eosinophils 1      % Basophils 1      % Immature Granulocytes 0      NRBCs per 100 WBC 0      Absolute Neutrophils 3.7      Absolute Lymphocytes 0.4 (*)     Absolute Monocytes 0.8      Absolute Eosinophils 0.0      Absolute Basophils 0.1      Absolute Immature Granulocytes 0.0      Absolute NRBCs 0.0     ERYTHROCYTE SEDIMENTATION RATE AUTO - Normal    Erythrocyte Sedimentation Rate 10     COMPREHENSIVE METABOLIC PANEL - Normal    Sodium 138      Potassium 3.8      Chloride 108      Carbon Dioxide (CO2) 25      Anion Gap 5  "     Urea Nitrogen 12      Creatinine 0.56      Calcium 9.2      Glucose 87      Alkaline Phosphatase 55      AST 19      ALT 37      Protein Total 7.8      Albumin 4.0      Bilirubin Total 0.2      GFR Estimate >90        Emergency Department Course:     Reviewed:  I reviewed nursing notes, vitals, past medical history and Care Everywhere    Assessments:  1506 I obtained history and examined the patient as noted above.   1648 I rechecked the patient and explained findings.  1706 I rechecked the patient. She is comfortable with discharge.    Interventions:  1528 Benadryl 50 mg IV  1528 Decadron 10 mg IV  1530 Compazine 10 mg IV    Disposition:  The patient was discharged to home.     Impression & Plan   Medical Decision Making:  This is a 51 year old woman who comes in to the emergency department with about 24-36 hours of some diffuse headache, low back pian, and some hip pain. She denies fevers, cough, sore throat, focal numbness or weakness. She does not usually have headaches but this was more of a gradual onset. She has had her COVID vaccinations but has not received her booster and she is a . On exam, she is slightly warm but she has no focal neurologic deficits. She has no meningismus. She has no respiratory distress. There were some readings of low O2 sats on the monitor, however when we readjust her O2 sat monitor she is consistently in the mid 90s without requirements for oxygen. Her baseline labs are unremarkable but her COVID is positive. She did receive the headache medications which has helped her. I think her symptoms are related to COVID. We did see if she would qualify for the antiviral oral medications, but she does not. She will be discharged with routine COVID information and follow up as needed. Her  will get himself tested.     Diagnosis:    ICD-10-CM    1. Infection due to 2019 novel coronavirus  U07.1      Scribe Disclosure:  I, Johana Feliciano, am serving as a scribe at 3:04  PM on 5/9/2022 to document services personally performed by Gabe Meyer MD based on my observations and the provider's statements to me.     Gabe Meyer MD  05/09/22 1966

## 2022-05-09 NOTE — ED TRIAGE NOTES
Saturday night could not sleep due to headache starting, did not sleep last night either due to headache. Aches on right side low back, right scapula and over the top of the entire head. Worse when lying down, No h/o headaches other than occasionally. Tooki Ibuprofen last night at 1:15 am and 9:45 am today - 800 mg each time with minimal relief. Denies visual changes, nausea or vomiting or visual chagnes.